# Patient Record
Sex: FEMALE | Race: WHITE | NOT HISPANIC OR LATINO | Employment: PART TIME
[De-identification: names, ages, dates, MRNs, and addresses within clinical notes are randomized per-mention and may not be internally consistent; named-entity substitution may affect disease eponyms.]

---

## 2017-12-31 ENCOUNTER — HEALTH MAINTENANCE LETTER (OUTPATIENT)
Age: 52
End: 2017-12-31

## 2020-03-10 ENCOUNTER — HEALTH MAINTENANCE LETTER (OUTPATIENT)
Age: 55
End: 2020-03-10

## 2020-12-27 ENCOUNTER — HEALTH MAINTENANCE LETTER (OUTPATIENT)
Age: 55
End: 2020-12-27

## 2021-04-24 ENCOUNTER — HEALTH MAINTENANCE LETTER (OUTPATIENT)
Age: 56
End: 2021-04-24

## 2021-10-04 ENCOUNTER — HEALTH MAINTENANCE LETTER (OUTPATIENT)
Age: 56
End: 2021-10-04

## 2022-02-02 ENCOUNTER — NURSE TRIAGE (OUTPATIENT)
Dept: NURSING | Facility: CLINIC | Age: 57
End: 2022-02-02
Payer: COMMERCIAL

## 2022-02-02 NOTE — TELEPHONE ENCOUNTER
RN triage   Call from pt   Pt states tested positive covid on 1/24  Pt states she is still coughing lots and sats drop to 85-89 ---- and also 95-98   Off and on dizzy when gets up  No falls   Sometimes at rest P = 94-- pt states she and her mom and sister has something w/ their pulse -- she cannot remember what -- does not see doctor about this and does not take meds for this condition   Pt states she feels weak and tired -- is able to stand and walk and get around   Pt wants to know when is she going to feel better ?    Reviewed home care advice - and isolation advice and when to be seen and when to be seen urgently     Gerri Astorga RN  [facility]  Triage Nurse Advisor    COVID 19 Nurse Triage Plan/Patient Instructions    Please be aware that novel coronavirus (COVID-19) may be circulating in the community. If you develop symptoms such as fever, cough, or SOB or if you have concerns about the presence of another infection including coronavirus (COVID-19), please contact your health care provider or visit [email or web address].     Disposition/Instructions    Home care recommended. Follow home care protocol based instructions.    Thank you for taking steps to prevent the spread of this virus.  o Limit your contact with others.  o Wear a simple mask to cover your cough.  o Wash your hands well and often.    Resources    [facility]: About COVID-19: [email or web address]    CDC: What to Do If You're Sick: www.cdc.gov/coronavirus/2019-ncov/about/steps-when-sick.html    CDC: Ending Home Isolation: www.cdc.gov/coronavirus/2019-ncov/hcp/disposition-in-home-patients.html     CDC: Caring for Someone: www.cdc.gov/coronavirus/2019-ncov/if-you-are-sick/care-for-someone.html     [facility]: Interim Guidance for Hospital Discharge to Home: www.health.[email or web address].[place]/diseases/coronavirus/hcp/hospdischarge.pdf    [facility] clinical trials (COVID-19 research studies): [email or web address]      Below are the COVID-19 hotlines at the [place] Department of [facility] ([facility]). Interpreters are available.   o For health questions: Call 511-056-9024 or 1-590.235.7276 (7 a.m. to 7 p.m.)  o For questions about schools and childcare: Call 099-033-8043 or 1-660.159.9806 (7 a.m. to 7 p.m.)       Reason for Disposition    [1] COVID-19 diagnosed by positive lab test AND [2] mild symptoms (e.g., cough, fever, others) AND [3] no complications or SOB    Additional Information    Negative: SEVERE difficulty breathing (e.g., struggling for each breath, speaks in single words)    Negative: Difficult to awaken or acting confused (e.g., disoriented, slurred speech)    Negative: Bluish (or gray) lips or face now    Negative: Shock suspected (e.g., cold/pale/clammy skin, too weak to stand, low BP, rapid pulse)    Negative: Sounds like a life-threatening emergency to the triager    Negative: SEVERE or constant chest pain or pressure (Exception: mild central chest pain, present only when coughing)    Negative: MODERATE difficulty breathing (e.g., speaks in phrases, SOB even at rest, pulse 100-120)    Negative: [1] Headache AND [2] stiff neck (can't touch chin to chest)    Negative: Chest pain or pressure    Negative: MILD difficulty breathing (e.g., minimal/no SOB at rest, SOB with walking, pulse <100)    Negative: Fever > 103 F (39.4 C)    Negative: [1] Fever > 101 F (38.3 C) AND [2] age > 60 years    Negative: [1] Fever > 100.0 F (37.8 C) AND [2] bedridden (e.g., nursing home patient, CVA, chronic illness, recovering from surgery)    Negative: HIGH RISK for severe COVID complications (e.g., age > 64 years, obesity with BMI > 25, pregnant, chronic lung disease or other chronic medical condition)  (Exception: Already seen by PCP and no new or worsening symptoms.)    Negative: Cough present > 3 weeks    Protocols used: CORONAVIRUS (COVID-19) DIAGNOSED OR [id number] 8.25.2021

## 2022-03-20 ENCOUNTER — HEALTH MAINTENANCE LETTER (OUTPATIENT)
Age: 57
End: 2022-03-20

## 2024-02-28 ENCOUNTER — NURSE TRIAGE (OUTPATIENT)
Dept: NURSING | Facility: CLINIC | Age: 59
End: 2024-02-28
Payer: COMMERCIAL

## 2024-02-28 NOTE — TELEPHONE ENCOUNTER
Triage Call:     Pt calling to find out where she should go to rule out a DVT. She has had some symptoms for over a month now and decided that she would like to be seen. She does not have a PCP    Sx:   Left lower inner calf area pain intermittently   Pain is worse with lying down  No swelling  No discoloration  Normal temperature and color to distal area/foot  No injury or reason for the pain that patient is aware of    Blood clotting issues run in her family     Disposition: ED/UCC or office with PCP approval. Pt was made aware that not all [facility] have US capabilities, so that the ED would be most appropriate for evaluation.     Reason for Disposition   Thigh or calf pain in only one leg and present > 1 hour    Additional Information   Negative: Chest pain   Negative: Difficulty breathing   Negative: Entire foot is cool or blue in comparison to other side   Negative: Unable to walk   Negative: Fever and red area (or area very tender to touch)   Negative: Swollen joint and fever    Protocols used: Leg Pain-A-[place]  Nya Ramos RN  [facility] Nurse Advisor 10:25 AM 2/28/2024

## 2024-06-22 ENCOUNTER — OFFICE VISIT (OUTPATIENT)
Dept: URGENT CARE | Facility: URGENT CARE | Age: 59
End: 2024-06-22
Payer: COMMERCIAL

## 2024-06-22 VITALS
DIASTOLIC BLOOD PRESSURE: 90 MMHG | TEMPERATURE: 97.6 F | RESPIRATION RATE: 16 BRPM | SYSTOLIC BLOOD PRESSURE: 140 MMHG | BODY MASS INDEX: 28.27 KG/M2 | WEIGHT: 175.9 LBS | OXYGEN SATURATION: 99 % | HEIGHT: 66 IN | HEART RATE: 86 BPM

## 2024-06-22 DIAGNOSIS — D17.30 LIPOMA OF SKIN AND SUBCUTANEOUS TISSUE: Primary | ICD-10-CM

## 2024-06-22 PROCEDURE — 99203 OFFICE O/P NEW LOW 30 MIN: CPT | Performed by: INTERNAL MEDICINE

## 2024-06-22 NOTE — PROGRESS NOTES
"SUBJECTIVE:  Chief complaint of a growth on the right flank.  This was noted about ten days ago.  Not painful or tender.  Just present under the skin.      OBJECTIVE:  BP (!) 140/90   Pulse 86   Temp 97.6  F (36.4  C)   Resp 16   Ht 1.676 m (5' 6\")   Wt 79.8 kg (175 lb 14.4 oz)   SpO2 99%   BMI 28.39 kg/m    GEN: alert, cheerful, interactive  SKIN: there is a 3 x 7 cm, transversely oriented, soft, mobile, well circumscribed, subcutaneous mass on the right posterolateral flank.  The mass estimates to be about the diameter of a kiwi fruit.    ASSESSMENT/PLAN:    ICD-10-CM    1. Lipoma of skin and subcutaneous tissue  D17.30       This mass has all of the characteristics of a benign lipoma.  We discussed the strategy of observation on these types of benign subcutaneous masses.   Refer for resection if the mass becomes more symptomatic or demonstrates noticeable enlargement over the next several months.    Tate Bardales MD    "